# Patient Record
Sex: MALE | Race: WHITE | URBAN - METROPOLITAN AREA
[De-identification: names, ages, dates, MRNs, and addresses within clinical notes are randomized per-mention and may not be internally consistent; named-entity substitution may affect disease eponyms.]

---

## 2022-10-30 ENCOUNTER — EMERGENCY (EMERGENCY)
Facility: HOSPITAL | Age: 67
LOS: 0 days | Discharge: HOME | End: 2022-10-31
Attending: EMERGENCY MEDICINE
Payer: MEDICARE

## 2022-10-30 VITALS — WEIGHT: 199.96 LBS | HEIGHT: 68 IN

## 2022-10-30 DIAGNOSIS — R73.9 HYPERGLYCEMIA, UNSPECIFIED: ICD-10-CM

## 2022-10-30 DIAGNOSIS — I10 ESSENTIAL (PRIMARY) HYPERTENSION: ICD-10-CM

## 2022-10-30 DIAGNOSIS — N20.0 CALCULUS OF KIDNEY: ICD-10-CM

## 2022-10-30 DIAGNOSIS — R10.9 UNSPECIFIED ABDOMINAL PAIN: ICD-10-CM

## 2022-10-30 DIAGNOSIS — Z90.79 ACQUIRED ABSENCE OF OTHER GENITAL ORGAN(S): ICD-10-CM

## 2022-10-30 DIAGNOSIS — J18.9 PNEUMONIA, UNSPECIFIED ORGANISM: ICD-10-CM

## 2022-10-30 DIAGNOSIS — M62.838 OTHER MUSCLE SPASM: ICD-10-CM

## 2022-10-30 DIAGNOSIS — R68.83 CHILLS (WITHOUT FEVER): ICD-10-CM

## 2022-10-30 DIAGNOSIS — K21.9 GASTRO-ESOPHAGEAL REFLUX DISEASE WITHOUT ESOPHAGITIS: ICD-10-CM

## 2022-10-30 DIAGNOSIS — R25.1 TREMOR, UNSPECIFIED: ICD-10-CM

## 2022-10-30 DIAGNOSIS — C34.90 MALIGNANT NEOPLASM OF UNSPECIFIED PART OF UNSPECIFIED BRONCHUS OR LUNG: ICD-10-CM

## 2022-10-30 LAB
ALBUMIN SERPL ELPH-MCNC: 3.4 G/DL — SIGNIFICANT CHANGE UP (ref 3.3–5)
ALP SERPL-CCNC: 59 U/L — SIGNIFICANT CHANGE UP (ref 40–120)
ALT FLD-CCNC: 30 U/L — SIGNIFICANT CHANGE UP (ref 12–78)
ANION GAP SERPL CALC-SCNC: 5 MMOL/L — SIGNIFICANT CHANGE UP (ref 5–17)
APPEARANCE UR: CLEAR — SIGNIFICANT CHANGE UP
AST SERPL-CCNC: 18 U/L — SIGNIFICANT CHANGE UP (ref 15–37)
BASOPHILS # BLD AUTO: 0.04 K/UL — SIGNIFICANT CHANGE UP (ref 0–0.2)
BASOPHILS NFR BLD AUTO: 0.3 % — SIGNIFICANT CHANGE UP (ref 0–2)
BILIRUB SERPL-MCNC: 0.6 MG/DL — SIGNIFICANT CHANGE UP (ref 0.2–1.2)
BILIRUB UR-MCNC: NEGATIVE — SIGNIFICANT CHANGE UP
BUN SERPL-MCNC: 18 MG/DL — SIGNIFICANT CHANGE UP (ref 7–23)
CALCIUM SERPL-MCNC: 9.2 MG/DL — SIGNIFICANT CHANGE UP (ref 8.5–10.1)
CHLORIDE SERPL-SCNC: 100 MMOL/L — SIGNIFICANT CHANGE UP (ref 96–108)
CO2 SERPL-SCNC: 25 MMOL/L — SIGNIFICANT CHANGE UP (ref 22–31)
COLOR SPEC: YELLOW — SIGNIFICANT CHANGE UP
CREAT SERPL-MCNC: 1.49 MG/DL — HIGH (ref 0.5–1.3)
D DIMER BLD IA.RAPID-MCNC: 261 NG/ML DDU — HIGH
DIFF PNL FLD: ABNORMAL
EGFR: 50 ML/MIN/1.73M2 — LOW
EOSINOPHIL # BLD AUTO: 0.1 K/UL — SIGNIFICANT CHANGE UP (ref 0–0.5)
EOSINOPHIL NFR BLD AUTO: 0.7 % — SIGNIFICANT CHANGE UP (ref 0–6)
GLUCOSE SERPL-MCNC: 181 MG/DL — HIGH (ref 70–99)
GLUCOSE UR QL: 1000 MG/DL
HCT VFR BLD CALC: 42.7 % — SIGNIFICANT CHANGE UP (ref 39–50)
HGB BLD-MCNC: 13.6 G/DL — SIGNIFICANT CHANGE UP (ref 13–17)
IMM GRANULOCYTES NFR BLD AUTO: 0.6 % — SIGNIFICANT CHANGE UP (ref 0–0.9)
KETONES UR-MCNC: NEGATIVE — SIGNIFICANT CHANGE UP
LEUKOCYTE ESTERASE UR-ACNC: NEGATIVE — SIGNIFICANT CHANGE UP
LIDOCAIN IGE QN: 198 U/L — SIGNIFICANT CHANGE UP (ref 73–393)
LYMPHOCYTES # BLD AUTO: 1.7 K/UL — SIGNIFICANT CHANGE UP (ref 1–3.3)
LYMPHOCYTES # BLD AUTO: 11.1 % — LOW (ref 13–44)
MCHC RBC-ENTMCNC: 27.4 PG — SIGNIFICANT CHANGE UP (ref 27–34)
MCHC RBC-ENTMCNC: 31.9 GM/DL — LOW (ref 32–36)
MCV RBC AUTO: 86.1 FL — SIGNIFICANT CHANGE UP (ref 80–100)
MONOCYTES # BLD AUTO: 1.6 K/UL — HIGH (ref 0–0.9)
MONOCYTES NFR BLD AUTO: 10.5 % — SIGNIFICANT CHANGE UP (ref 2–14)
NEUTROPHILS # BLD AUTO: 11.75 K/UL — HIGH (ref 1.8–7.4)
NEUTROPHILS NFR BLD AUTO: 76.8 % — SIGNIFICANT CHANGE UP (ref 43–77)
NITRITE UR-MCNC: NEGATIVE — SIGNIFICANT CHANGE UP
PH UR: 5 — SIGNIFICANT CHANGE UP (ref 5–8)
PLATELET # BLD AUTO: 226 K/UL — SIGNIFICANT CHANGE UP (ref 150–400)
POTASSIUM SERPL-MCNC: 4.1 MMOL/L — SIGNIFICANT CHANGE UP (ref 3.5–5.3)
POTASSIUM SERPL-SCNC: 4.1 MMOL/L — SIGNIFICANT CHANGE UP (ref 3.5–5.3)
PROT SERPL-MCNC: 8.5 GM/DL — HIGH (ref 6–8.3)
PROT UR-MCNC: 30 MG/DL
RBC # BLD: 4.96 M/UL — SIGNIFICANT CHANGE UP (ref 4.2–5.8)
RBC # FLD: 14.3 % — SIGNIFICANT CHANGE UP (ref 10.3–14.5)
SODIUM SERPL-SCNC: 130 MMOL/L — LOW (ref 135–145)
SP GR SPEC: 1.02 — SIGNIFICANT CHANGE UP (ref 1.01–1.02)
TROPONIN I, HIGH SENSITIVITY RESULT: 4.04 NG/L — SIGNIFICANT CHANGE UP
UROBILINOGEN FLD QL: NEGATIVE — SIGNIFICANT CHANGE UP
WBC # BLD: 15.28 K/UL — HIGH (ref 3.8–10.5)
WBC # FLD AUTO: 15.28 K/UL — HIGH (ref 3.8–10.5)

## 2022-10-30 PROCEDURE — 93010 ELECTROCARDIOGRAM REPORT: CPT

## 2022-10-30 PROCEDURE — 71045 X-RAY EXAM CHEST 1 VIEW: CPT | Mod: 26

## 2022-10-30 PROCEDURE — 93005 ELECTROCARDIOGRAM TRACING: CPT

## 2022-10-30 PROCEDURE — 71275 CT ANGIOGRAPHY CHEST: CPT | Mod: 26,MA

## 2022-10-30 PROCEDURE — 84484 ASSAY OF TROPONIN QUANT: CPT

## 2022-10-30 PROCEDURE — 36415 COLL VENOUS BLD VENIPUNCTURE: CPT

## 2022-10-30 PROCEDURE — 74177 CT ABD & PELVIS W/CONTRAST: CPT | Mod: MA

## 2022-10-30 PROCEDURE — 81001 URINALYSIS AUTO W/SCOPE: CPT

## 2022-10-30 PROCEDURE — 71275 CT ANGIOGRAPHY CHEST: CPT | Mod: MA

## 2022-10-30 PROCEDURE — 99285 EMERGENCY DEPT VISIT HI MDM: CPT

## 2022-10-30 PROCEDURE — 85025 COMPLETE CBC W/AUTO DIFF WBC: CPT

## 2022-10-30 PROCEDURE — 80053 COMPREHEN METABOLIC PANEL: CPT

## 2022-10-30 PROCEDURE — 83690 ASSAY OF LIPASE: CPT

## 2022-10-30 PROCEDURE — 99284 EMERGENCY DEPT VISIT MOD MDM: CPT | Mod: 25

## 2022-10-30 PROCEDURE — 85379 FIBRIN DEGRADATION QUANT: CPT

## 2022-10-30 PROCEDURE — 71045 X-RAY EXAM CHEST 1 VIEW: CPT

## 2022-10-30 PROCEDURE — 74177 CT ABD & PELVIS W/CONTRAST: CPT | Mod: 26,MA

## 2022-10-30 PROCEDURE — 96360 HYDRATION IV INFUSION INIT: CPT | Mod: XU

## 2022-10-30 RX ORDER — SODIUM CHLORIDE 9 MG/ML
1000 INJECTION INTRAMUSCULAR; INTRAVENOUS; SUBCUTANEOUS ONCE
Refills: 0 | Status: COMPLETED | OUTPATIENT
Start: 2022-10-30 | End: 2022-10-30

## 2022-10-30 NOTE — ED STATDOCS - NSICDXPASTMEDICALHX_GEN_ALL_CORE_FT
PAST MEDICAL HISTORY:  GERD (gastroesophageal reflux disease)     HTN (hypertension)     Lung cancer

## 2022-10-30 NOTE — ED STATDOCS - CARE PROVIDER_API CALL
Klever Ramirez)  Urology  [address], [address] [place] [address]  Phone: [phone]  Fax: [phone]  Follow Up Time:

## 2022-10-30 NOTE — ED STATDOCS - PROGRESS NOTE DETAILS
updated pt and wife with result and plan for cta chest, ct abd and pelvis due to elevated d dimer, blood in urine and left falnk pain with no fever but chills BARBRA Marin DO pt has 7mm stone dependent in bladder, therefor left sided pain pt ws experinecing likely a 7mm kidney stone that passed into bladder, possible rll pna. Pt had chills, has elevated wbc, and states he has cough with mucous, will treat with levaquin for pna, pt is not toxic,  pt and wife updated B Tania DO

## 2022-10-30 NOTE — ED STATDOCS - CLINICAL SUMMARY MEDICAL DECISION MAKING FREE TEXT BOX
Pt w/ chills on Friday and today w/ left flank pain radiating to left lower abd. Will get labs including trop and D-dimer, if there is abnormality in the urine will get CT abd/pelvis to r/o obstructive uropathy.

## 2022-10-30 NOTE — ED STATDOCS - PATIENT PORTAL LINK FT
You can access the FollowMyHealth Patient Portal offered by [facility] by registering at the following website: [email or web address]. By joining [employer]’s FollowMyHealth portal, you will also be able to view your health information using other applications (apps) compatible with our system.

## 2022-10-30 NOTE — ED ADULT TRIAGE NOTE - CHIEF COMPLAINT QUOTE
Pt. presents to ED c/o intermittent chest and left sided muscle spasms. Pt. states this afternoon he had a muscle spasm that felt like squeezing down the left side of his torso. Pt. states this has happened in the past and his cardiology workups have been negative. Pt. currently denying any pain. Denies chest pressure/SOB. Pt. taken for EKG.
None

## 2022-10-30 NOTE — ED STATDOCS - CARE PLAN
Principal Discharge DX:	Kidney stone  Secondary Diagnosis:	Acute hyperglycemia  Secondary Diagnosis:	Community acquired pneumonia   1

## 2022-10-30 NOTE — ED ADULT NURSE NOTE - CHIEF COMPLAINT QUOTE
Pt. presents to ED c/o intermittent chest and left sided muscle spasms. Pt. states this afternoon he had a muscle spasm that felt like squeezing down the left side of his torso. Pt. states this has happened in the past and his cardiology workups have been negative. Pt. currently denying any pain. Denies chest pressure/SOB. Pt. taken for EKG.

## 2022-10-30 NOTE — ED ADULT NURSE NOTE - OBJECTIVE STATEMENT
Patient is a 67 year old male with history of HTN, GERD, and lung CA presented to the ED c/o muscle spasms to the left upper chest migrating to the left lower back  which started earlier today.  He denied chestpain or trouble breathing.  He is alert and appears to be in no acute distress.

## 2022-10-30 NOTE — ED STATDOCS - OBJECTIVE STATEMENT
66 y/o male w/ a PMHx of HTN, GERD, and lung CA presents to the ED c/o muscle spasms to the left upper chest migrating to the left lower back x today. Pt reports x2 days ago he was trying to peel an tangerine when he had a sudden onset of generalized body shaking which self resolved after 20 min. Denies SOB or CP. No other complaints at this time. Pt reports recent negative cardio workup including stress test. Cardio: Dr. Duran

## 2022-10-30 NOTE — ED STATDOCS - NSFOLLOWUPINSTRUCTIONS_ED_ALL_ED_FT
Kidney Stones    The urinary tract with a close-up of a kidney showing kidney stones.   Kidney stones are rock-like masses that form inside of the kidneys. Kidneys are organs that make pee (urine). A kidney stone may move into other parts of the urinary tract, including:  •The tubes that connect the kidneys to the bladder (ureters).      •The bladder.      •The tube that carries urine out of the body (urethra).      Kidney stones can cause very bad pain and can block the flow of pee. The stone usually leaves your body (passes) through your pee. You may need to have a doctor take out the stone.      What are the causes?    Kidney stones may be caused by:  •A condition in which certain glands make too much parathyroid hormone (primary hyperparathyroidism).      •A buildup of a type of crystals in the bladder made of a chemical called uric acid. The body makes uric acid when you eat certain foods.      •Narrowing (stricture) of one or both of the ureters.      •A kidney blockage that you were born with.      •Past surgery on the kidney or the ureters, such as gastric bypass surgery.        What increases the risk?    You are more likely to develop this condition if:  •You have had a kidney stone in the past.      •You have a family history of kidney stones.      •You do not drink enough water.      •You eat a diet that is high in protein, salt (sodium), or sugar.      •You are overweight or very overweight (obese).        What are the signs or symptoms?    Symptoms of a kidney stone may include:  •Pain in the side of the belly, right below the ribs (flank pain). Pain usually spreads (radiates) to the groin.      •Needing to pee often or right away (urgently).      •Pain when going pee (urinating).      •Blood in your pee (hematuria).      •Feeling like you may vomit (nauseous).      •Vomiting.      •Fever and chills.        How is this treated?    Treatment depends on the size, location, and makeup of the kidney stones. The stones will often pass out of the body through peeing. You may need to:  •Drink more fluid to help pass the stone. In some cases, you may be given fluids through an IV tube put into one of your veins at the hospital.       •Take medicine for pain.       •Make changes in your diet to help keep kidney stones from coming back.      Sometimes, medical procedures are needed to remove a kidney stone. This may involve:  •A procedure to break up kidney stones using a beam of light (laser) or shock waves.      •Surgery to remove the kidney stones.         Follow these instructions at home:    Medicines     •Take over-the-counter and prescription medicines only as told by your doctor.      •Ask your doctor if the medicine prescribed to you requires you to avoid driving or using heavy machinery.      Eating and drinking     •Drink enough fluid to keep your pee pale yellow. You may be told to drink at least 8–10 glasses of water each day. This will help you pass the stone.    •If told by your doctor, change your diet. This may include:  •Limiting how much salt you eat.      •Eating more fruits and vegetables.      •Limiting how much meat, poultry, fish, and eggs you eat.        •Follow instructions from your doctor about eating or drinking restrictions.      General instructions   •Collect pee samples as told by your doctor. You may need to collect a pee sample:  •24 hours after a stone comes out.      •8–12 weeks after a stone comes out, and every 6–12 months after that.        •Strain your pee every time you pee (urinate), for as long as told. Use the strainer that your doctor recommends.      • Do not throw out the stone. Keep it so that it can be tested by your doctor.      •Keep all follow-up visits as told by your doctor. This is important. You may need follow-up tests.        How is this prevented?  A comparison of three sample cups showing dark yellow, yellow, and pale yellow urine.   To prevent another kidney stone:  •Drink enough fluid to keep your pee pale yellow. This is the best way to prevent kidney stones.      •Eat healthy foods.      •Avoid certain foods as told by your doctor. You may be told to eat less protein.      •Stay at a healthy weight.        Where to find more information    •National Kidney Foundation (NKF): www.kidney.org      •[employer] ([facility]): www.urologyhealth.org        Contact a doctor if:    •You have pain that gets worse or does not get better with medicine.        Get help right away if:    •You have a fever or chills.      •You get very bad pain.      •You get new pain in your belly (abdomen).      •You pass out (faint).      •You cannot pee.        Summary    •Kidney stones are rock-like masses that form inside of the kidneys.      •Kidney stones can cause very bad pain and can block the flow of pee.      •The stones will often pass out of the body through peeing.      •Drink enough fluid to keep your pee pale yellow.      This information is not intended to replace advice given to you by your health care provider. Make sure you discuss any questions you have with your health care provider.      Document Revised: 08/22/2022 Document Reviewed: 08/22/2022    Community-Acquired Pneumonia, Adult      Pneumonia is an infection of the lungs. It causes irritation and swelling in the airways of the lungs. Mucus and fluid may also build up inside the airways. This may cause coughing and trouble breathing.    One type of pneumonia can happen while you are in a hospital. A different type can happen when you are not in a hospital (community-acquired pneumonia).      What are the causes?     This condition is caused by germs (viruses, bacteria, or fungi). Some types of germs can spread from person to person. Pneumonia is not thought to spread from person to person.      What increases the risk?    You are more likely to develop this condition if:•You have a long-term (chronic) disease, such as:  •Disease of the lungs. This may be chronic obstructive pulmonary disease (COPD) or asthma.      •Heart failure.      •Cystic fibrosis.      •Diabetes.      •Kidney disease.      •Sickle cell disease.      •HIV.      •You have other health problems, such as:  •Your body's defense system (immune system) is weak.      •A condition that may cause you to breathe in fluids from your mouth and nose.        •You had your spleen taken out.      •You do not take good care of your teeth and mouth (poor dental hygiene).      •You use or have used tobacco products.      •You travel where the germs that cause this illness are common.      •You are near certain animals or the places they live.      •You are older than 65 years of age.        What are the signs or symptoms?    Symptoms of this condition include:  •A cough.      •A fever.      •Sweating or chills.      •Chest pain, often when you breathe deeply or cough.    •Breathing problems, such as:  •Fast breathing.       •Trouble breathing.      •Shortness of breath.         •Feeling tired (fatigued).      •Muscle aches.        How is this treated?    Treatment for this condition depends on many things, such as:  •The cause of your illness.      •Your medicines.      •Your other health problems.      Most adults can be treated at home. Sometimes, treatment must happen in a hospital.  •Treatment may include medicines to kill germs.      •Medicines may depend on which germ caused your illness.      Very bad pneumonia is rare. If you get it, you may:  •Have a machine to help you breathe.      •Have fluid taken away from around your lungs.        Follow these instructions at home:    Medicines     •Take over-the-counter and prescription medicines only as told by your doctor.      •Take cough medicine only if you are losing sleep. Cough medicine can keep your body from taking mucus away from your lungs.      •If you were prescribed an antibiotic medicine, take it as told by your doctor. Do not stop taking the antibiotic even if you start to feel better.        Lifestyle                   • Do not drink alcohol.      • Do not use any products that contain nicotine or tobacco, such as cigarettes, e-cigarettes, and chewing tobacco. If you need help quitting, ask your doctor.      •Eat a healthy diet. This includes a lot of vegetables, fruits, whole grains, low-fat dairy products, and low-fat (lean) protein.        General instructions      •Rest a lot. Sleep for at least 8 hours each night.      •Sleep with your head and neck raised. Put a few pillows under your head or sleep in a reclining chair.      •Return to your normal activities as told by your doctor. Ask your doctor what activities are safe for you.      •Drink enough fluid to keep your pee (urine) pale yellow.      •If your throat is sore, rinse your mouth often with salt water. To make salt water, dissolve ½–1 tsp (3–6 g) of salt in 1 cup (237 mL) of warm water.      •Keep all follow-up visits as told by your doctor. This is important.        How is this prevented?    You can lower your risk of pneumonia by:•Getting the pneumonia shot (vaccine). These shots have different types and schedules. Ask your doctor what works best for you. Think about getting this shot if:  •You are older than 65 years of age.    •You are 19–65 years of age and:   •You are being treated for cancer.      •You have long-term lung disease.      •You have other problems that affect your body's defense system. Ask your doctor if you have one of these.          •Getting your flu shot every year. Ask your doctor which type of shot is best for you.      •Going to the dentist as often as told.      •Washing your hands often with soap and water for at least 20 seconds. If you cannot use soap and water, use hand .        Contact a doctor if:    •You have a fever.      •You lose sleep because your cough medicine does not help.        Get help right away if:    •You are short of breath and this gets worse.      •You have more chest pain.    •Your sickness gets worse. This is very serious if:  •You are an older adult.      •Your body's defense system is weak.        •You cough up blood.      These symptoms may be an emergency. Do not wait to see if the symptoms will go away. Get medical help right away. Call your local emergency services (911 in the [place] Do not drive yourself to the hospital.       Summary    •Pneumonia is an infection of the lungs.      •Community-acquired pneumonia affects people who have not been in the hospital. Certain germs can cause this infection.      •This condition may be treated with medicines that kill germs.      •For very bad pneumonia, you may need a hospital stay and treatment to help with breathing.      This information is not intended to replace advice given to you by your health care provider. Make sure you discuss any questions you have with your health care provider.      Document Revised: 09/29/2020 Document Reviewed: 09/29/2020    Levofloxacin Tablets      What is this medication?    LEVOFLOXACIN (faizan negrete sin) treats infections caused by bacteria. It belongs to a group of medications called quinolone antibiotics. It will not treat colds, the flu, or infections caused by viruses.    This medicine may be used for other purposes; ask your health care provider or pharmacist if you have questions.    COMMON BRAND NAME(S): Levaquin, Levaquin Leva-Cabrera      What should I tell my care team before I take this medication?  if you have any joint pain it could be a sign of tendinitis, stop taking levaquin and call your doctor immediately    They need to know if you have any of these conditions:  •Bone problems  •Diabetes  •Heart disease  •High blood pressure  •History of irregular heartbeat  •History of low levels of potassium in the blood  •Joint problems  •Kidney disease  •Liver disease  •Mental illness  •Myasthenia gravis  •Seizures  •Tendon problems  •Tingling of the fingers or toes, or other nerve disorder  •An unusual or allergic reaction to levofloxacin, other quinolone antibiotics, foods, dyes, or preservatives  •Pregnant or trying to get pregnant  •Breast-feeding      How should I use this medication?    Take this medication by mouth with a full glass of water. Follow the directions on the prescription label. You can take it with or without food. If it upsets your stomach, take it with food. Take your medication at regular intervals. Do not take your medication more often than directed. Take all of your medication as directed even if you think you are better. Do not skip doses or stop your medication early.    Avoid antacids, calcium, iron, and zinc products for 2 hours before and 2 hours after taking a dose of this medication.    A special MedGuide will be given to you by the pharmacist with each prescription and refill. Be sure to read this information carefully each time.    Talk to your care team about the use of this medication in children. While this medication may be prescribed for children as young as 6 months for selected conditions, precautions do apply.    Overdosage: If you think you have taken too much of this medicine contact a poison control center or emergency room at once.    NOTE: This medicine is only for you. Do not share this medicine with others.      What if I miss a dose?    If you miss a dose, take it as soon as you remember. If it is almost time for your next dose, take only that dose. Do not take double or extra doses.      What may interact with this medication?    Do not take this medication with any of the following:  •Cisapride  •Dronedarone  •Pimozide  •Thioridazine    This medication may also interact with the following:  •Antacids  •Birth control pills  •Certain medications for diabetes, like glipizide, glyburide, or insulin  •Certain medications that treat or prevent blood clots like warfarin  •Didanosine buffered tablets or powder  •Multivitamins  •NSAIDS, medications for pain and inflammation, like ibuprofen or naproxen  •Other medications that prolong the QT interval (cause an abnormal heart rhythm) like dofetilide, ziprasidone  •Steroid medications like prednisone or cortisone  •Sucralfate  •Theophylline    This list may not describe all possible interactions. Give your health care provider a list of all the medicines, herbs, non-prescription drugs, or dietary supplements you use. Also tell them if you smoke, drink alcohol, or use illegal drugs. Some items may interact with your medicine.      What should I watch for while using this medication?    Tell your care team if your symptoms do not start to get better or if they get worse.    Do not treat diarrhea with over the counter products. Contact your care team if you have diarrhea that lasts more than 2 days or if it is severe and watery.    Check with your care team if you get an attack of severe diarrhea, nausea and vomiting, or if you sweat a lot. The loss of too much body fluid can make it dangerous for you to take this medication.    This medication may increase blood sugar. Ask your care team if changes in diet or medications are needed if you have diabetes.    You may get drowsy or dizzy. Do not drive, use machinery, or do anything that needs mental alertness until you know how this medication affects you. Do not sit or stand up quickly, especially if you are an older patient. This reduces the risk of dizzy or fainting spells.    This medication can make you more sensitive to the sun. Keep out of the sun. If you cannot avoid being in the sun, wear protective clothing and use sunscreen. Do not use sun lamps or tanning beds/booths.      What side effects may I notice from receiving this medication?    Side effects that you should report to your care team as soon as possible:  •Allergic reactions—skin rash, itching, hives, swelling of the face, lips, tongue, or throat  •Heart rhythm changes—fast or irregular heartbeat, dizziness, feeling faint or lightheaded, chest pain, trouble breathing  •Increased pressure around the brain—severe headache, blurry vision, change in vision, nausea, vomiting  •Joint, muscle, or tendon pain, swelling, or stiffness  •Liver injury—right upper belly pain, loss of appetite, nausea, light-colored stool, dark yellow or brown urine, yellowing skin or eyes, unusual weakness or fatigue  •Mood and behavior changes—anxiety, nervousness, confusion, hallucinations, irritability, hostility, thoughts of suicide or self-harm, worsening mood, feelings of depression  •Pain, tingling, or numbness in the hands or feet  •Redness, blistering, peeling, or loosening of the skin, including inside the mouth  •Seizures  •Severe diarrhea, fever  •Sudden or severe chest, back, or stomach pain  •Unusual vaginal discharge, itching, or odor    Side effects that usually do not require medical attention (report to your care team if they continue or are bothersome):  •Diarrhea  •Dizziness  •Headache  •Nausea  •Sensitivity to light  •Trouble sleeping    This list may not describe all possible side effects. Call your doctor for medical advice about side effects. You may report side effects to FDA at 8-590-GWT-2405.      Where should I keep my medication?    Keep out of the reach of children.    Store at room temperature between 15 and 30 degrees C (59 and 86 degrees F). Keep in a tightly closed container. Throw away any unused medication after the expiration date.    NOTE: This sheet is a summary. It may not cover all possible information. If you have questions about this medicine, talk to your doctor, pharmacist, or health care provider.      © 2022 Elsevier/Gold Standard (2022-02-25 00:00:00)       Elsevier Patient Education © 2022 [employer]vier Inc.       Elsevier Patient Education © 2022 [employer] Inc.

## 2022-10-31 VITALS
RESPIRATION RATE: 16 BRPM | TEMPERATURE: 98 F | HEART RATE: 81 BPM | SYSTOLIC BLOOD PRESSURE: 144 MMHG | OXYGEN SATURATION: 97 % | DIASTOLIC BLOOD PRESSURE: 61 MMHG

## 2022-10-31 RX ORDER — CIPROFLOXACIN LACTATE 400MG/40ML
1 VIAL (ML) INTRAVENOUS
Qty: 4 | Refills: 0
Start: 2022-10-31 | End: 2022-11-03

## 2022-10-31 RX ADMIN — SODIUM CHLORIDE 1000 MILLILITER(S): 9 INJECTION INTRAMUSCULAR; INTRAVENOUS; SUBCUTANEOUS at 00:05

## 2022-10-31 RX ADMIN — SODIUM CHLORIDE 1000 MILLILITER(S): 9 INJECTION INTRAMUSCULAR; INTRAVENOUS; SUBCUTANEOUS at 01:32

## 2023-03-09 ENCOUNTER — OFFICE (OUTPATIENT)
Dept: URBAN - METROPOLITAN AREA CLINIC 112 | Facility: CLINIC | Age: 68
Setting detail: OPHTHALMOLOGY
End: 2023-03-09
Payer: MEDICARE

## 2023-03-09 DIAGNOSIS — B30.9: ICD-10-CM

## 2023-03-09 PROCEDURE — 92002 INTRM OPH EXAM NEW PATIENT: CPT | Performed by: OPHTHALMOLOGY

## 2023-03-09 ASSESSMENT — TONOMETRY
OD_IOP_MMHG: 16
OS_IOP_MMHG: 15

## 2023-03-09 ASSESSMENT — REFRACTION_CURRENTRX
OS_AXIS: 166
OS_SPHERE: -4.75
OD_AXIS: 016
OD_VPRISM_DIRECTION: SV
OD_OVR_VA: 20/
OD_CYLINDER: -1.00
OS_VPRISM_DIRECTION: SV
OD_SPHERE: -5.00
OS_OVR_VA: 20/
OS_CYLINDER: -1.00

## 2023-03-09 ASSESSMENT — REFRACTION_AUTOREFRACTION
OD_SPHERE: -5.50
OS_AXIS: 178
OD_CYLINDER: -0.50
OD_AXIS: 174
OS_CYLINDER: -1.25
OS_SPHERE: -4.75

## 2023-03-09 ASSESSMENT — SPHEQUIV_DERIVED
OD_SPHEQUIV: -5.75
OS_SPHEQUIV: -5.375

## 2023-03-09 ASSESSMENT — KERATOMETRY
OD_K1POWER_DIOPTERS: 46.75
OS_K2POWER_DIOPTERS: 48.00
OS_K1POWER_DIOPTERS: 47.00
OD_K2POWER_DIOPTERS: 47.75
OD_AXISANGLE_DEGREES: 110
OS_AXISANGLE_DEGREES: 088

## 2023-03-09 ASSESSMENT — CONFRONTATIONAL VISUAL FIELD TEST (CVF)
OD_FINDINGS: FULL
OS_FINDINGS: FULL

## 2023-03-09 ASSESSMENT — AXIALLENGTH_DERIVED
OD_AL: 24.4875
OS_AL: 24.2338

## 2023-03-09 ASSESSMENT — VISUAL ACUITY
OD_BCVA: 20/20-1
OS_BCVA: 20/25+1

## 2023-03-13 ENCOUNTER — OFFICE (OUTPATIENT)
Dept: URBAN - METROPOLITAN AREA CLINIC 103 | Facility: CLINIC | Age: 68
Setting detail: OPHTHALMOLOGY
End: 2023-03-13
Payer: MEDICARE

## 2023-03-13 ENCOUNTER — RX ONLY (RX ONLY)
Age: 68
End: 2023-03-13

## 2023-03-13 ENCOUNTER — APPOINTMENT (OUTPATIENT)
Dept: ORTHOPEDIC SURGERY | Facility: CLINIC | Age: 68
End: 2023-03-13
Payer: MEDICARE

## 2023-03-13 VITALS — WEIGHT: 195 LBS | HEIGHT: 68 IN | BODY MASS INDEX: 29.55 KG/M2

## 2023-03-13 DIAGNOSIS — H35.033: ICD-10-CM

## 2023-03-13 DIAGNOSIS — M25.562 PAIN IN LEFT KNEE: ICD-10-CM

## 2023-03-13 DIAGNOSIS — E11.9: ICD-10-CM

## 2023-03-13 DIAGNOSIS — H25.13: ICD-10-CM

## 2023-03-13 DIAGNOSIS — B30.9: ICD-10-CM

## 2023-03-13 DIAGNOSIS — H10.45: ICD-10-CM

## 2023-03-13 PROCEDURE — 92250 FUNDUS PHOTOGRAPHY W/I&R: CPT | Performed by: OPHTHALMOLOGY

## 2023-03-13 PROCEDURE — 92014 COMPRE OPH EXAM EST PT 1/>: CPT | Performed by: OPHTHALMOLOGY

## 2023-03-13 PROCEDURE — 73562 X-RAY EXAM OF KNEE 3: CPT | Mod: LT

## 2023-03-13 PROCEDURE — 99203 OFFICE O/P NEW LOW 30 MIN: CPT

## 2023-03-13 ASSESSMENT — SPHEQUIV_DERIVED
OS_SPHEQUIV: -4.75
OD_SPHEQUIV: -4.875

## 2023-03-13 ASSESSMENT — AXIALLENGTH_DERIVED
OS_AL: 24.0748
OD_AL: 24.1256

## 2023-03-13 ASSESSMENT — REFRACTION_AUTOREFRACTION
OD_SPHERE: -4.75
OS_SPHERE: -4.50
OD_CYLINDER: -0.25
OS_CYLINDER: -0.50
OD_AXIS: 152
OS_AXIS: 178

## 2023-03-13 ASSESSMENT — REFRACTION_CURRENTRX
OD_OVR_VA: 20/
OD_AXIS: 016
OS_SPHERE: -4.75
OD_SPHERE: -5.00
OS_OVR_VA: 20/
OS_CYLINDER: -1.00
OS_VPRISM_DIRECTION: SV
OD_CYLINDER: -1.00
OS_AXIS: 166
OD_VPRISM_DIRECTION: SV

## 2023-03-13 ASSESSMENT — VISUAL ACUITY
OS_BCVA: 20/25-1
OD_BCVA: 20/20-1

## 2023-03-13 ASSESSMENT — KERATOMETRY
OD_AXISANGLE_DEGREES: 104
OS_K2POWER_DIOPTERS: 47.75
OD_K1POWER_DIOPTERS: 46.75
OS_K1POWER_DIOPTERS: 46.75
OD_K2POWER_DIOPTERS: 47.75
OS_AXISANGLE_DEGREES: 083

## 2023-03-13 ASSESSMENT — CONFRONTATIONAL VISUAL FIELD TEST (CVF)
OD_FINDINGS: FULL
OS_FINDINGS: FULL

## 2023-03-13 ASSESSMENT — TONOMETRY
OS_IOP_MMHG: 18
OD_IOP_MMHG: 18

## 2023-03-20 PROBLEM — M25.562 KNEE PAIN, LEFT: Status: ACTIVE | Noted: 2023-03-13

## 2023-03-20 NOTE — ASSESSMENT
[FreeTextEntry1] : Left medial meniscus tear - reviewed radiographs and pathoanatomy with patient. Discussed management will be aimed at NSAIDs prn, activity modification, PT.\par \par F/u prn

## 2023-03-20 NOTE — HISTORY OF PRESENT ILLNESS
[de-identified] : 68M, PMHX of HTN presents with left knee pain for approx 10 days. Reports started suddenly. Denies injury/trauma. Denies outside imaging/treatment. He reports used to play soccer with that leg and feels may be an old injury.

## 2023-03-27 ENCOUNTER — OFFICE (OUTPATIENT)
Dept: URBAN - METROPOLITAN AREA CLINIC 103 | Facility: CLINIC | Age: 68
Setting detail: OPHTHALMOLOGY
End: 2023-03-27
Payer: MEDICARE

## 2023-03-27 DIAGNOSIS — E11.9: ICD-10-CM

## 2023-03-27 DIAGNOSIS — B30.9: ICD-10-CM

## 2023-03-27 DIAGNOSIS — H35.033: ICD-10-CM

## 2023-03-27 DIAGNOSIS — H25.13: ICD-10-CM

## 2023-03-27 PROCEDURE — 92012 INTRM OPH EXAM EST PATIENT: CPT | Performed by: OPHTHALMOLOGY

## 2023-03-27 ASSESSMENT — REFRACTION_CURRENTRX
OD_OVR_VA: 20/
OS_AXIS: 166
OS_SPHERE: -4.75
OS_OVR_VA: 20/
OD_CYLINDER: -1.00
OS_VPRISM_DIRECTION: SV
OD_AXIS: 016
OS_CYLINDER: -1.00
OD_VPRISM_DIRECTION: SV
OD_SPHERE: -5.00

## 2023-03-27 ASSESSMENT — VISUAL ACUITY
OD_BCVA: 20/25-1
OS_BCVA: 20/30

## 2023-03-27 ASSESSMENT — REFRACTION_AUTOREFRACTION
OS_CYLINDER: SPH
OD_SPHERE: UTP
OS_SPHERE: -4.25

## 2023-03-27 ASSESSMENT — KERATOMETRY
OD_K2POWER_DIOPTERS: 48.00
OD_AXISANGLE_DEGREES: 101
OD_K1POWER_DIOPTERS: 47.00
OS_K1POWER_DIOPTERS: 47.25
OS_AXISANGLE_DEGREES: 087
OS_K2POWER_DIOPTERS: 48.25

## 2023-03-27 ASSESSMENT — CONFRONTATIONAL VISUAL FIELD TEST (CVF)
OS_FINDINGS: FULL
OD_FINDINGS: FULL

## 2023-04-04 ENCOUNTER — FORM ENCOUNTER (OUTPATIENT)
Age: 68
End: 2023-04-04

## 2023-04-25 ENCOUNTER — FORM ENCOUNTER (OUTPATIENT)
Age: 68
End: 2023-04-25

## 2023-05-04 ENCOUNTER — FORM ENCOUNTER (OUTPATIENT)
Age: 68
End: 2023-05-04

## 2023-06-04 ENCOUNTER — FORM ENCOUNTER (OUTPATIENT)
Age: 68
End: 2023-06-04

## 2023-06-12 ENCOUNTER — FORM ENCOUNTER (OUTPATIENT)
Age: 68
End: 2023-06-12

## 2023-06-22 ENCOUNTER — FORM ENCOUNTER (OUTPATIENT)
Age: 68
End: 2023-06-22

## 2023-07-05 ENCOUNTER — FORM ENCOUNTER (OUTPATIENT)
Age: 68
End: 2023-07-05

## 2023-07-12 ENCOUNTER — FORM ENCOUNTER (OUTPATIENT)
Age: 68
End: 2023-07-12